# Patient Record
Sex: MALE | Race: WHITE
[De-identification: names, ages, dates, MRNs, and addresses within clinical notes are randomized per-mention and may not be internally consistent; named-entity substitution may affect disease eponyms.]

---

## 2019-01-03 ENCOUNTER — HOSPITAL ENCOUNTER (EMERGENCY)
Dept: HOSPITAL 62 - ER | Age: 29
Discharge: HOME | End: 2019-01-03
Payer: SELF-PAY

## 2019-01-03 VITALS — SYSTOLIC BLOOD PRESSURE: 141 MMHG | DIASTOLIC BLOOD PRESSURE: 88 MMHG

## 2019-01-03 DIAGNOSIS — K21.9: ICD-10-CM

## 2019-01-03 DIAGNOSIS — R19.7: ICD-10-CM

## 2019-01-03 DIAGNOSIS — R10.84: ICD-10-CM

## 2019-01-03 DIAGNOSIS — F17.200: ICD-10-CM

## 2019-01-03 DIAGNOSIS — R11.2: Primary | ICD-10-CM

## 2019-01-03 LAB
ADD MANUAL DIFF: NO
ALBUMIN SERPL-MCNC: 5 G/DL
ALP SERPL-CCNC: 84 U/L
ALT SERPL-CCNC: 39 U/L
ANION GAP SERPL CALC-SCNC: 14 MMOL/L
AST SERPL-CCNC: 39 U/L
BASOPHILS # BLD AUTO: 0.1 10^3/UL
BASOPHILS NFR BLD AUTO: 0.6 %
BILIRUB DIRECT SERPL-MCNC: 0.3 MG/DL
BILIRUB SERPL-MCNC: 0.8 MG/DL
BUN SERPL-MCNC: 26 MG/DL
CALCIUM: 10.1 MG/DL
CHLORIDE SERPL-SCNC: 93 MMOL/L
CO2 SERPL-SCNC: 31 MMOL/L
EOSINOPHIL # BLD AUTO: 0.1 10^3/UL
EOSINOPHIL NFR BLD AUTO: 0.6 %
ERYTHROCYTE [DISTWIDTH] IN BLOOD BY AUTOMATED COUNT: 13.3 %
GLUCOSE SERPL-MCNC: 114 MG/DL
HCT VFR BLD CALC: 54.2 %
HGB BLD-MCNC: 19.2 G/DL
LYMPHOCYTES # BLD AUTO: 1.2 10^3/UL
LYMPHOCYTES NFR BLD AUTO: 12.7 %
MCH RBC QN AUTO: 30.9 PG
MCHC RBC AUTO-ENTMCNC: 35.4 G/DL
MCV RBC AUTO: 87 FL
MONOCYTES # BLD AUTO: 1.4 10^3/UL
MONOCYTES NFR BLD AUTO: 14.6 %
NEUTROPHILS # BLD AUTO: 6.9 10^3/UL
NEUTS SEG NFR BLD AUTO: 71.5 %
PLATELET # BLD: 229 10^3/UL
POTASSIUM SERPL-SCNC: 4.3 MMOL/L
PROT SERPL-MCNC: 8.9 G/DL
RBC # BLD AUTO: 6.21 10^6/UL
SODIUM SERPL-SCNC: 137.7 MMOL/L
TOTAL CELLS COUNTED % (AUTO): 100 %
WBC # BLD AUTO: 9.7 10^3/UL

## 2019-01-03 PROCEDURE — 99284 EMERGENCY DEPT VISIT MOD MDM: CPT

## 2019-01-03 PROCEDURE — 76705 ECHO EXAM OF ABDOMEN: CPT

## 2019-01-03 PROCEDURE — 96374 THER/PROPH/DIAG INJ IV PUSH: CPT

## 2019-01-03 PROCEDURE — 36415 COLL VENOUS BLD VENIPUNCTURE: CPT

## 2019-01-03 PROCEDURE — 80053 COMPREHEN METABOLIC PANEL: CPT

## 2019-01-03 PROCEDURE — 86677 HELICOBACTER PYLORI ANTIBODY: CPT

## 2019-01-03 PROCEDURE — 96375 TX/PRO/DX INJ NEW DRUG ADDON: CPT

## 2019-01-03 PROCEDURE — 96361 HYDRATE IV INFUSION ADD-ON: CPT

## 2019-01-03 PROCEDURE — 85025 COMPLETE CBC W/AUTO DIFF WBC: CPT

## 2019-01-03 NOTE — PDOC CONSULTATION
Consultation


Consult Date: 01/03/19


Consult reason:: Constant upper abdominal pain, nausea, vomiting





History of Present Illness


History of Present Illness: 


RAFI REYES is a 28 year old male seen at the request of the emergency room 

physician.  The patient reports a 5-day history of gnawing, stabbing, epigastric

abdominal pain.  The patient has a history of peptic ulcer disease.  The patient

takes BC powders on a regular basis.  He is prescribed Prilosec, however he only

takes it intermittently (when he can afford it).  He also smokes cigarettes and 

drinks alcohol.  His pain is constant and worsening.  He rates it as 10 out of 

10.  The pain today is similar to pain he has experienced previously.  It hurts 

with everything that he eats or drinks, including crackers and water.  The 

patient has reported streaks of blood in his vomitus.  The patient has never 

been tested for H. pylori.  The patient has not been able to identify specific 

fatty food intolerance.  He denies chest pain, shortness of breath, fevers, 

chills, blurry vision, headache, sore throat, dysuria, melena, hematochezia.








Past Medical History


GI Medical History: Reports: Gastroesophageal Reflux Disease, Peptic Ulcer 

Disease





Social History


Smoking Status: Current Every Day Smoker


Frequency of Alcohol Use: Social - Frequent alcohol use


Drugs: None


Hx Prescription Drug Abuse: No





Family History


Parental Family History Reviewed: Yes


Children Family History Reviewed: Yes


Sibling(s) Family History Reviewed.: Yes





Medication/Allergy


Home Medications: 








Omeprazole 20 mg PO DAILY 01/03/19 








Allergies/Adverse Reactions: 


                                        





No Known Allergies Allergy (Verified 01/03/19 14:48)


   











Review of Systems


Constitutional: PRESENT: anorexia, fatigue.  ABSENT: chills, fever(s), night 

sweats


Eyes: ABSENT: visual disturbances


Ears: ABSENT: hearing changes


Nose, Mouth, and Throat: ABSENT: sore throat


Cardiovascular: ABSENT: chest pain


Respiratory: ABSENT: cough, dyspnea


Gastrointestinal: PRESENT: abdominal pain, hematemesis, nausea, vomiting.  

ABSENT: melena


Genitourinary: ABSENT: dysuria


Musculoskeletal: ABSENT: back pain


Integumentary: ABSENT: pruritus, rash


Neurological: ABSENT: confusion, convulsions, dizziness


Psychiatric: ABSENT: anxiety, depression


Endocrine: ABSENT: cold intolerance, heat intolerance


Hematologic/Lymphatic: ABSENT: easy bleeding, easy bruising





Physical Exam


Vital Signs: 


                                        











Temp Pulse Resp BP Pulse Ox


 


 99.0 F   128 H  16   148/95 H  96 


 


 01/03/19 14:51  01/03/19 14:51  01/03/19 14:51  01/03/19 14:51  01/03/19 14:51








                                 Intake & Output











 01/02/19 01/03/19 01/04/19





 06:59 06:59 06:59


 


Intake Total   2000


 


Balance   2000


 


Weight   89.8 kg











General appearance: PRESENT: no acute distress


Head exam: PRESENT: atraumatic, normocephalic


Eye exam: PRESENT: EOMI, PERRLA.  ABSENT: scleral icterus


Mouth exam: ABSENT: moist, neck supple


Neck exam: ABSENT: meningismus, tenderness, thyromegaly, tracheal deviation


Respiratory exam: PRESENT: clear to auscultation iris, unlabored.  ABSENT: chest 

wall tenderness, retraction, tachypnea, wheezes


Cardiovascular exam: PRESENT: RRR


Pulses: PRESENT: normal radial pulses


Vascular exam: PRESENT: normal capillary refill.  ABSENT: pallor


GI/Abdominal exam: PRESENT: soft, tenderness - Mild epigastric.  ABSENT: 

distended, Childs's sign


Rectal exam: PRESENT: deferred


Extremities exam: ABSENT: clubbing


Musculoskeletal exam: ABSENT: deformity


Neurological exam: PRESENT: alert, awake, oriented to person, oriented to place,

oriented to time, oriented to situation, CN II-XII grossly intact.  ABSENT: 

motor sensory deficit


Psychiatric exam: ABSENT: agitated, anxious, depressed


Focused psych exam: ABSENT: delusional


Skin exam: ABSENT: cyanosis, erythema, jaundice





Results


Laboratory Results: 


                                        





                                 01/03/19 16:54 





                                 01/03/19 16:54 





                                        











  01/03/19 01/03/19





  16:54 16:54


 


WBC  9.7 


 


RBC  6.21 H 


 


Hgb  19.2 H 


 


Hct  54.2 H 


 


MCV  87 


 


MCH  30.9 


 


MCHC  35.4 


 


RDW  13.3 


 


Plt Count  229 


 


Seg Neutrophils %  71.5 


 


Lymphocytes %  12.7 L 


 


Monocytes %  14.6 H 


 


Eosinophils %  0.6 


 


Basophils %  0.6 


 


Absolute Neutrophils  6.9 


 


Absolute Lymphocytes  1.2 


 


Absolute Monocytes  1.4 


 


Absolute Eosinophils  0.1 


 


Absolute Basophils  0.1 


 


Sodium   137.7


 


Potassium   4.3


 


Chloride   93 L


 


Carbon Dioxide   31 H


 


Anion Gap   14


 


BUN   26 H


 


Creatinine   0.97


 


Est GFR ( Amer)   > 60


 


Est GFR (Non-Af Amer)   > 60


 


Glucose   114 H


 


Calcium   10.1


 


Total Bilirubin   0.8


 


AST   39


 


ALT   39


 


Alkaline Phosphatase   84


 


Total Protein   8.9 H


 


Albumin   5.0











Impressions: 


                                        





Abdomen Ultrasound  01/03/19 16:37


IMPRESSION:  There is some gallbladder sludge.  There is a positive sonographic 

Childs sign.  No gallstones.  No ductal dilatation.


 














Assessment & Plan





- Diagnosis


(1) Abdominal pain, acute, epigastric


Is this a current diagnosis for this admission?: Yes   





(2) Gastritis


Qualifiers: 


   Gastritis type: other gastritis   Chronicity: acute   Gastritis bleeding: 

presence of bleeding unspecified   Qualified Code(s): K29.00 - Acute gastritis 

without bleeding   


Is this a current diagnosis for this admission?: Yes   





- Plan Summary


Plan Summary: 





This is a 28-year-old male with epigastric abdominal pain.  I believe the 

patient is experiencing gastritis.  The patient has a right upper quadrant 

ultrasound that shows minimal amount of gallbladder sludge without signs of 

cholecystitis (pericholecystic fluid or gallbladder wall thickening).  The 

patient has a long history of peptic ulcer disease, has been taking large 

amounts of NSAIDs, has been smoking, has been drinking alcohol, and has been 

vomiting small amounts of blood.  The patient takes Prilosec, but only uses it 

intermittently (which can make symptoms worse).  Patient cannot relate a 

specific history of fatty food intolerance.  The patient received complete 

relief with viscous lidocaine ingestion.  I have recommended the patient start 

Zantac 150 milligrams p.o. twice daily.  I will prescribe him Carafate 1 g p.o. 

4 times per day.  I will test him for H. pylori (serum test).





At this time, the patient does not require cholecystectomy as treatment.  I have

offered the patient EGD for diagnosis, however he has declined.  I will provide 

the patient with prescriptions.  Disposition per the ED physician.

## 2019-01-03 NOTE — ER DOCUMENT REPORT
ED General





- General


Chief Complaint: Nausea/Vomiting/Diarrhea


Stated Complaint: VOMITING,ABDOMINAL PAIN


Notes: 





Patient is a 28-year-old male who presents to the emergency department with a 

chief complaint of nausea, vomiting, and diarrhea.  He states that he has not b

een able to keep any food down in the past 2 days.  His pain is mainly in his 

right upper quadrant and is throughout his whole abdomen.  He is from [facility], and is doing some work on [employer].  He states that about last year

he was seen in the emergency department for the same symptoms and was told that 

he had gallbladder issues.  He denies any stones at that time.  He states he was

supposed to follow-up with a surgeon, but was not able to.


TRAVEL OUTSIDE OF THE [place] IN LAST 30 DAYS: No





- Related Data


Allergies/Adverse Reactions: 


                                        





No Known Allergies Allergy (Verified 01/03/19 14:48)


   











Past Medical History





- General


Information source: Patient





- Social History


Smoking Status: Current Every Day Smoker


Frequency of alcohol use: Occasional


Drug Abuse: None


Lives with: Friend


Family History: Reviewed & Not Pertinent


Patient has suicidal ideation: No


Patient has homicidal ideation: No


Renal/ Medical History: Denies: Hx Peritoneal Dialysis


GI Medical History: Reports: Hx Gastroesophageal Reflux Disease


Past Surgical History: Reports: Hx Oral Surgery - wisdom teeth





Review of Systems





- Review of Systems


Notes: 





REVIEW OF SYSTEMS:





CONSTITUTIONAL :    Denies recent illness.  Denies recent unintentional weight 

loss.  Denies fever,  chills, or sweats. 


EENT: Denies eye, ear, throat, or mouth pain, discharge, or symptoms.  Denies 

nasal or sinus congestion.


CARDIOVASCULAR:  Denies chest pain.


RESPIRATORY: Denies shortness of breath, cough, congestion, difficulty 

breathing, or wheezing. 


GASTROINTESTINAL: See HPI


GENITOURINARY:  Denies difficulty urinating, burning, blood in urine, urgency or

frequency.


MUSCULOSKELETAL:  Denies neck and back pain.  Denies joint pain or swelling.


SKIN:   Denies rash, itchiness, or lesions


HEMATOLOGIC :   Denies easy bruising or bleeding.


LYMPHATIC:  Denies swollen, painful, enlarged glands.


NEUROLOGICAL: Denies no numbness or tingling denies weakness.  Denies headache. 

Denies altered mental status.  Denies alteration in speech.


PSYCHIATRIC:  Denies stress, anxiety, alteration in sleep patterns, or 

depression.





All other systems reviewed and negative.





Physical Exam





- Vital signs


Vitals: 


                                        











Temp Pulse Resp BP Pulse Ox


 


 99.0 F   128 H  16   148/95 H  96 


 


 01/03/19 14:51  01/03/19 14:51  01/03/19 14:51  01/03/19 14:51  01/03/19 14:51














- Notes


Notes: 





PHYSICAL EXAMINATION:





GENERAL: Appears well, healthy, well-nourished, no acute distress. 





HEAD:  Normocephalic, atraumatic.





EYES:  PERRL, conjunctiva normal, all extraocular movements intact, sclera 

nonicteric





ENT:  Moist mucous membranes. 





NECK: Supple, no noticeable swelling, redness, rash.  Normal range of motion.





LUNGS: Equal breath sounds bilaterally and clear to auscultation.  No wheezes 

rales or rhonchi.





CARDIOVASCULAR: S1-S2, regular rate, regular rhythm.  Radial pulses 2+, normal.





ABDOMEN: Normoactive bowel sounds.  Soft, right upper quadrant tenderness,  no 

guarding, no rebound tenderness, and no masses palpated.





EXTREMITIES: Normal strength and range of motion, no pitting or edema.  No 

cyanosis. 





NEUROLOGICAL: Moves all extremities upon command.  Strength 5/5 in all 

extremities. 





PSYCH: Normal mood, normal affect.





SKIN: Warm, dry.  No rash, lesions, ulcerations noted.  Normal skin turgor.





Course





- Re-evaluation


Re-evalutation: 





01/03/19 19:37


Patient does have a positive Childs sign.  He also does have sludge on his 

ultrasound.  I have called Dr. Moore in regards to this case.  Dr. Moore will 

evaluate the patient.


01/03/19 20:39


Dr. Moore has evaluated the patient and he states that the patient has a 

stomach ulcer.  Dr. Moore will give him his viscous lidocaine.  The patient 

states that he does not want to stay in the hospital and be admitted.  Dr. Moore will also order him some Carafate.  If the patient does better with the 

viscous lidocaine, Dr. Moore states the patient is stable for discharge.


01/03/19 21:53


Patient states that he feels tremendously better.  Dr. Moore has already wrote 

a prescription for Carafate and famotidine.  Per Dr. Moore's recommendations, 

patient will be discharged since he is feeling better after having the viscous 

lidocaine.  Verbal discharge instructions were given to the patient.  They 

verbalized understanding.  They are stable for discharge.








- Vital Signs


Vital signs: 


                                        











Temp Pulse Resp BP Pulse Ox


 


 97.6 F   90   16   141/88 H  97 


 


 01/03/19 22:13  01/03/19 22:13  01/03/19 22:13  01/03/19 22:13  01/03/19 22:13














- Laboratory


Result Diagrams: 


                                 01/03/19 16:54





                                 01/03/19 16:54


Laboratory results interpreted by me: 


                                        











  01/03/19 01/03/19





  16:54 16:54


 


RBC  6.21 H 


 


Hgb  19.2 H 


 


Hct  54.2 H 


 


Lymphocytes %  12.7 L 


 


Monocytes %  14.6 H 


 


Chloride   93 L


 


Carbon Dioxide   31 H


 


BUN   26 H


 


Glucose   114 H


 


Total Protein   8.9 H














Discharge





- Discharge


Clinical Impression: 


Abdominal pain


Qualifiers:


 Abdominal location: generalized Qualified Code(s): R10.84 - Generalized 

abdominal pain





Condition: Stable


Disposition: HOME, SELF-CARE


Additional Instructions: 


You were seen in the emergency department today for abdominal pain and vomiting.

 Dr. Moore, her surgeon evaluated you and you have a stomach ulcer.  Please 

take the medications he has prescribed to you.  Please stay away from greasy 

foods, spicy foods, or any foods that will irritate your stomach.  If you 

develop worsening abdominal pain, develop a fever greater than 100.4 F, or have

any symptoms that are worrisome to you, please return to the emergency 

department.

## 2019-01-03 NOTE — RADIOLOGY REPORT (SQ)
EXAM DESCRIPTION:  U/S ABDOMEN LIMITED W/O DOP



COMPLETED DATE/TIME:  1/3/2019 5:35 pm



REASON FOR STUDY:  RUQ pain. LIMITED RUQ US



COMPARISON:  None.



TECHNIQUE:  Dynamic and static grayscale images acquired of the abdomen and recorded on PACS. Yuli smith selected color Doppler and spectral images recorded.



LIMITATIONS:  None.



FINDINGS:  PANCREAS: Poorly seen.

LIVER: No masses. Echotexture normal.

LIVER VASCULATURE: Normal directional flow of the main portal vein and hepatic veins.

GALLBLADDER: No stones.  There is a small amount of sludge.  No pericholecystic fluid.

ULTRASOUND-DETECTED MCCULLOUGH'S SIGN: Positive

INTRAHEPATIC DUCTS AND COMMON DUCT: CBD and intrahepatic ducts normal caliber. No filling defects.

INFERIOR VENA CAVA: Normal flow.

AORTA: No aneurysm.

RIGHT KIDNEY:  Normal size 9.8 cm. Normal echogenicity. No solid or suspicious masses. No hydronephro
sis. No calcifications.

PERITONEAL AND RIGHT PLEURAL SPACE: No ascites or effusions.

OTHER: No other significant findings.



IMPRESSION:  There is some gallbladder sludge.  There is a positive sonographic Mccullough sign.  No gall
stones.  No ductal dilatation.



TECHNICAL DOCUMENTATION:  JOB ID:  [id number]

 2011 [email or web address]- All Rights Reserved



Reading location - IP/workstation name: [email or web address]

## 2019-01-03 NOTE — ER DOCUMENT REPORT
ED Medical Screen (RME)





- General


Chief Complaint: Nausea/Vomiting/Diarrhea


Stated Complaint: VOMITING,ABDOMINAL PAIN


Notes: 





28-year-old male with history of peptic ulcer disease and questionable 

gallbladder disease comes to the emergency department for vomiting and diarrhea 

for the last 2-3 days.  He states that he is "in the bathroom constantly "and he

is gotten no sleep.  He complains that he has had diarrhea at least 20 times 

over the course of the last 48 hours.  He cannot tolerate any solid foods.  He 

can tolerate liquids for about 30 minutes and then he immediately has to go to 

the bathroom.  He complains of sweats and chills.  Patient is currently staying 

in a hotel and the night prior to his symptoms he ate at the [employer], the 

next morning had some pretzels, then his first episode of diarrhea was at about 

8 PM, approximately 24 hours after last known meal.





I have greeted and performed a rapid initial assessment of this patient.  A 

comprehensive ED assessment and evaluation of the patient, analysis of test 

results and completion of medical decision making process will be conducted by 

an additional ED providers.


TRAVEL OUTSIDE OF THE [place] IN LAST 30 DAYS: No





- Related Data


Allergies/Adverse Reactions: 


                                        





No Known Allergies Allergy (Verified 01/03/19 14:48)


   











Past Medical History





- Social History


Frequency of alcohol use: Occasional


Renal/ Medical History: Denies: Hx Peritoneal Dialysis


GI Medical History: Reports: Hx Gastroesophageal Reflux Disease


Past Surgical History: Reports: Hx Oral Surgery - wisdom teeth





Physical Exam





- Vital signs


Vitals: 





                                        











Temp Pulse Resp BP Pulse Ox


 


 99.0 F   128 H  16   148/95 H  96 


 


 01/03/19 14:51  01/03/19 14:51  01/03/19 14:51  01/03/19 14:51  01/03/19 14:51














- Abdominal


Inspection: Normal


Distension: No distension


Bowel sounds: Hyperactive


Tenderness: Tender - Moderate tenderness to palpation all 4 quadrants, worse in 

right upper quadrant.  Patient states that pain on palpation RUQ so severe that 

it is sending chills up his spine.  No rebound tenderness.  No evidence of acute

abdomen.





Course





- Vital Signs


Vital signs: 





                                        











Temp Pulse Resp BP Pulse Ox


 


 99.0 F   128 H  16   148/95 H  96 


 


 01/03/19 14:51  01/03/19 14:51  01/03/19 14:51  01/03/19 14:51  01/03/19 14:51